# Patient Record
Sex: MALE | Race: WHITE | NOT HISPANIC OR LATINO | URBAN - METROPOLITAN AREA
[De-identification: names, ages, dates, MRNs, and addresses within clinical notes are randomized per-mention and may not be internally consistent; named-entity substitution may affect disease eponyms.]

---

## 2023-08-28 PROBLEM — K21.9 GERD (GASTROESOPHAGEAL REFLUX DISEASE): Status: ACTIVE | Noted: 2023-08-28

## 2023-08-28 PROBLEM — N52.9 ERECTILE DYSFUNCTION: Status: ACTIVE | Noted: 2023-08-28

## 2023-08-28 PROBLEM — G25.0 FAMILIAL TREMOR: Status: ACTIVE | Noted: 2023-08-28

## 2023-08-28 PROBLEM — E78.5 HYPERLIPEMIA: Status: ACTIVE | Noted: 2023-08-28

## 2023-08-28 PROBLEM — R73.9 HYPERGLYCEMIA: Status: ACTIVE | Noted: 2023-08-28

## 2023-08-28 PROBLEM — F41.8 DEPRESSION WITH ANXIETY: Status: ACTIVE | Noted: 2023-08-28

## 2023-08-28 PROBLEM — E55.9 VITAMIN D DEFICIENCY: Status: ACTIVE | Noted: 2023-08-28

## 2023-08-28 PROBLEM — G25.81 RESTLESS LEGS SYNDROME: Status: ACTIVE | Noted: 2023-08-28

## 2023-08-28 PROBLEM — Z78.9 MEDICAL HOME PATIENT: Status: ACTIVE | Noted: 2023-08-28

## 2023-08-28 RX ORDER — PANTOPRAZOLE SODIUM 40 MG/1
TABLET, DELAYED RELEASE ORAL
COMMUNITY
Start: 2023-08-12

## 2023-08-28 RX ORDER — CHOLECALCIFEROL (VITAMIN D3) 50 MCG
TABLET ORAL EVERY 24 HOURS
COMMUNITY

## 2023-08-28 RX ORDER — SIMVASTATIN 20 MG/1
1 TABLET, FILM COATED ORAL
COMMUNITY
Start: 2023-07-17

## 2023-08-28 RX ORDER — DULOXETIN HYDROCHLORIDE 60 MG/1
120 CAPSULE, DELAYED RELEASE ORAL
COMMUNITY
Start: 2023-07-17

## 2023-08-28 RX ORDER — QUETIAPINE FUMARATE 300 MG/1
600 TABLET, FILM COATED ORAL
COMMUNITY
Start: 2023-07-17

## 2023-08-28 RX ORDER — LAMOTRIGINE 100 MG/1
100 TABLET ORAL
COMMUNITY
Start: 2023-07-17

## 2023-08-28 RX ORDER — SILDENAFIL 50 MG/1
TABLET, FILM COATED ORAL EVERY 24 HOURS
COMMUNITY

## 2023-08-28 RX ORDER — BUPROPION HYDROCHLORIDE 450 MG/1
TABLET, FILM COATED, EXTENDED RELEASE ORAL EVERY 24 HOURS
COMMUNITY

## 2023-11-07 ENCOUNTER — APPOINTMENT (OUTPATIENT)
Dept: PRIMARY CARE | Facility: CLINIC | Age: 53
End: 2023-11-07
Payer: COMMERCIAL

## 2025-03-25 ENCOUNTER — OFFICE VISIT (OUTPATIENT)
Dept: OPHTHALMOLOGY | Facility: CLINIC | Age: 55
End: 2025-03-25
Payer: COMMERCIAL

## 2025-03-25 DIAGNOSIS — H25.813 COMBINED FORMS OF AGE-RELATED CATARACT OF BOTH EYES: Primary | ICD-10-CM

## 2025-03-25 DIAGNOSIS — H01.02B SQUAMOUS BLEPHARITIS OF UPPER AND LOWER EYELIDS OF BOTH EYES: ICD-10-CM

## 2025-03-25 DIAGNOSIS — H57.03 MIOSIS NOT DUE TO MIOTICS: ICD-10-CM

## 2025-03-25 DIAGNOSIS — H01.02A SQUAMOUS BLEPHARITIS OF UPPER AND LOWER EYELIDS OF BOTH EYES: ICD-10-CM

## 2025-03-25 DIAGNOSIS — H02.831 DERMATOCHALASIS OF BOTH UPPER EYELIDS: ICD-10-CM

## 2025-03-25 DIAGNOSIS — H52.7 REFRACTIVE ERROR: ICD-10-CM

## 2025-03-25 DIAGNOSIS — H02.834 DERMATOCHALASIS OF BOTH UPPER EYELIDS: ICD-10-CM

## 2025-03-25 PROBLEM — R73.9 HYPERGLYCEMIA: Status: RESOLVED | Noted: 2023-08-28 | Resolved: 2025-03-25

## 2025-03-25 PROCEDURE — 92015 DETERMINE REFRACTIVE STATE: CPT | Mod: MUE | Performed by: OPHTHALMOLOGY

## 2025-03-25 PROCEDURE — 99214 OFFICE O/P EST MOD 30 MIN: CPT | Performed by: OPHTHALMOLOGY

## 2025-03-25 PROCEDURE — 92015 DETERMINE REFRACTIVE STATE: CPT | Performed by: OPHTHALMOLOGY

## 2025-03-25 PROCEDURE — 99204 OFFICE O/P NEW MOD 45 MIN: CPT | Performed by: OPHTHALMOLOGY

## 2025-03-25 RX ORDER — TAMSULOSIN HYDROCHLORIDE 0.4 MG/1
0.4 CAPSULE ORAL NIGHTLY
COMMUNITY
Start: 2024-09-17

## 2025-03-25 RX ORDER — ALPRAZOLAM 0.5 MG/1
0.5 TABLET ORAL DAILY PRN
COMMUNITY

## 2025-03-25 ASSESSMENT — REFRACTION_WEARINGRX
OD_CYLINDER: -1.50
OD_SPHERE: +2.50
OD_SPHERE: PLANO
OS_SPHERE: -0.25
OD_AXIS: 099
OS_SPHERE: +2.50
SPECS_TYPE: OTC READERS
SPECS_TYPE: SVL
OS_AXIS: 091
OS_CYLINDER: -1.75

## 2025-03-25 ASSESSMENT — ENCOUNTER SYMPTOMS
ENDOCRINE NEGATIVE: 0
RESPIRATORY NEGATIVE: 0
HEMATOLOGIC/LYMPHATIC NEGATIVE: 0
CARDIOVASCULAR NEGATIVE: 0
NEUROLOGICAL NEGATIVE: 0
EYES NEGATIVE: 0
CONSTITUTIONAL NEGATIVE: 0
GASTROINTESTINAL NEGATIVE: 0
MUSCULOSKELETAL NEGATIVE: 0
ALLERGIC/IMMUNOLOGIC NEGATIVE: 0
PSYCHIATRIC NEGATIVE: 0

## 2025-03-25 ASSESSMENT — PAIN SCALES - GENERAL: PAINLEVEL_OUTOF10: 0-NO PAIN

## 2025-03-25 ASSESSMENT — REFRACTION_MANIFEST
OS_SPHERE: -0.50
OS_AXIS: 095
OS_CYLINDER: -1.75
OD_SPHERE: -0.75
OD_CYLINDER: -0.75
OD_SPHERE: -2.50
OD_AXIS: 090
OD_AXIS: 090
OS_AXIS: 090
OS_ADD: +2.50
OD_ADD: +2.50
OD_CYLINDER: -0.50
OS_SPHERE: -2.00
OS_CYLINDER: -1.50
METHOD_AUTOREFRACTION: 1

## 2025-03-25 ASSESSMENT — CUP TO DISC RATIO
OD_RATIO: 0.1
OS_RATIO: 0.1

## 2025-03-25 ASSESSMENT — KERATOMETRY
OS_AXISANGLE_DEGREES: 15
OS_K2POWER_DIOPTERS: 44.25
OS_AXISANGLE2_DEGREES: 105
OD_AXISANGLE2_DEGREES: 180
OD_K1POWER_DIOPTERS: 44.00
OD_AXISANGLE_DEGREES: 90
METHOD_AUTO_MANUAL: AUTOMATED
OD_K2POWER_DIOPTERS: 44.00
OS_K1POWER_DIOPTERS: 43.75

## 2025-03-25 ASSESSMENT — PATIENT HEALTH QUESTIONNAIRE - PHQ9
2. FEELING DOWN, DEPRESSED OR HOPELESS: NOT AT ALL
SUM OF ALL RESPONSES TO PHQ9 QUESTIONS 1 AND 2: 0
1. LITTLE INTEREST OR PLEASURE IN DOING THINGS: NOT AT ALL

## 2025-03-25 ASSESSMENT — VISUAL ACUITY
METHOD: SNELLEN - SINGLE
OS_CC: 20/30
OD_CC+: -1
OD_CC: 20/25
CORRECTION_TYPE: GLASSES
OS_CC+: +1

## 2025-03-25 ASSESSMENT — TONOMETRY
OD_IOP_MMHG: 14
OS_IOP_MMHG: 14
IOP_METHOD: GOLDMANN APPLANATION

## 2025-03-25 ASSESSMENT — SLIT LAMP EXAM - LIDS
COMMENTS: 1+ DERMATOCHALASIS - UPPER LID, 1+ BLEPHARITIS
COMMENTS: 1+ DERMATOCHALASIS - UPPER LID, 1+ BLEPHARITIS

## 2025-03-25 ASSESSMENT — EXTERNAL EXAM - LEFT EYE: OS_EXAM: BROW PTOSIS

## 2025-03-25 ASSESSMENT — EXTERNAL EXAM - RIGHT EYE: OD_EXAM: BROW PTOSIS

## 2025-03-25 NOTE — PROGRESS NOTES
Subjective   Patient ID: Ab Felix is a 54 y.o. male.    Chief Complaint    Annual Exam       HPI    Saw an eye care pro[facility] in [place] 1 year ago, not happy with visit.    Complete exam.  No recent changes in health history or meds.  Vision is good.  Interested in progressives.    Last edited by Eladio Wilhelm MD on 3/25/2025 10:42 AM.        No current outpatient medications on file. (Ophthalmology pharm classes)       Current Outpatient Medications (Other)   Medication Sig Dispense Refill    ALPRAZolam (Xanax) 0.5 mg tablet Take 1 tablet (0.5 mg) by mouth once daily as needed for anxiety.      buPROPion XL (Forfivo XL) 450 mg 24 hr tablet once every 24 hours.      cholecalciferol (Vitamin D-3) 50 MCG (2000 UT) tablet once every 24 hours.      DULoxetine (Cymbalta) 60 mg DR capsule Take 2 capsules (120 mg) by mouth once daily.      FA-vit Bcomp-C-zinc-vitamin D3 0.8-2,000 mg-unit tablet once daily.      lamoTRIgine (LaMICtal) 100 mg tablet Take 1 tablet (100 mg) by mouth once daily.      pantoprazole (ProtoNix) 40 mg EC tablet       QUEtiapine (SEROquel) 300 mg tablet Take 2 tablets (600 mg) by mouth.      sildenafil (Viagra) 50 mg tablet once every 24 hours.      simvastatin (Zocor) 20 mg tablet Take 1 tablet (20 mg) by mouth once daily.      tamsulosin (Flomax) 0.4 mg 24 hr capsule Take 1 capsule (0.4 mg) by mouth once daily at bedtime.         Objective   Base Eye Exam       Visual Acuity (Snellen - Single)         Right Left Both    Dist cc 20/25 -1 20/30 +1     Near cc   J1      Correction: Glasses              Tonometry (Goldmann Applanation, 9:55 AM)         Right Left    Pressure 14 14              Pupils         Dark Shape React APD    Right 4 Round 1 None    Left 4 Round 1 None              Extraocular Movement         Right Left     Full Full              Dilation       Both eyes: 1% Tropic 2.5% Phen @ 9:55 AM                  Additional Tests       Keratometry (Automated)         K1 Axis K2  Axis    Right 44.00 180 44.00 90    Left 43.75 105 44.25 15                  Slit Lamp and Fundus Exam       External Exam         Right Left    External Brow ptosis Brow ptosis              Slit Lamp Exam         Right Left    Lids/Lashes 1+ Dermatochalasis - upper lid, 1+ Blepharitis 1+ Dermatochalasis - upper lid, 1+ Blepharitis    Conjunctiva/Sclera White and quiet White and quiet    Cornea Clear Clear    Anterior Chamber Deep and quiet Deep and quiet    Iris Round and reactive; flomax Round and reactive; flomax    Lens 1+ Nuclear sclerosis, 2+ Cortical cataract 1+ Nuclear sclerosis, 2+ Cortical cataract    Anterior Vitreous Vitreous syneresis Vitreous syneresis              Fundus Exam         Right Left    Disc Normal Normal    C/D Ratio 0.1 0.1    Macula Normal Normal    Vessels Normal Normal    Periphery Normal Normal                  Refraction       Wearing Rx         Sphere Cylinder Axis    Right [place] -1.50 099    Left -0.25 -1.75 091      Type: SVL              Wearing Rx #2         Sphere Cylinder Axis    Right +2.50      Left +2.50        Type: OTC readers              Manifest Refraction (Auto)         Sphere Cylinder Weed Dist VA Add Near VA    Right -2.50 -0.50 090       Left -2.00 -1.75 095         Pupillary Distance: 60              Manifest Refraction #2 (Subjective)         Sphere Cylinder Weed Dist VA Add Near VA    Right -0.75 -0.75 090 20/20 -2 +2.50 J1    Left -0.50 -1.50 090 20/30 +1 +2.50 J1      Pupillary Distance: 60              Final Rx         Sphere Cylinder Weed Dist VA Add Near VA    Right -0.75 -0.75 090 20/20 +2.50 J1    Left -0.50 -1.50 090 20/30 +2.50 J1      Type: PAL    Expiration Date: 3/25/2027    Pupillary Distance: 60              Final Rx #2         Sphere Cylinder Weed Dist VA Add Near VA    Right -0.75 -0.75 090 20/20      Left -0.50 -1.50 090 20/30        Type: DISTANCE ONLY    Expiration Date: 3/25/2027    Pupillary Distance: 60                    Assessment/Plan    Problem List Items Addressed This Visit          Eye/Vision problems    Refractive error     Progressives discussed.           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  Spec rx given.           Squamous blepharitis of upper and lower eyelids of both eyes    Dermatochalasis of both upper eyelids